# Patient Record
Sex: MALE | Race: OTHER | Employment: OTHER | URBAN - METROPOLITAN AREA
[De-identification: names, ages, dates, MRNs, and addresses within clinical notes are randomized per-mention and may not be internally consistent; named-entity substitution may affect disease eponyms.]

---

## 2019-01-07 ENCOUNTER — NEW PATIENT COMPREHENSIVE (OUTPATIENT)
Dept: URBAN - METROPOLITAN AREA CLINIC 39 | Facility: CLINIC | Age: 64
End: 2019-01-07

## 2019-01-07 DIAGNOSIS — H25.811: ICD-10-CM

## 2019-01-07 DIAGNOSIS — H25.812: ICD-10-CM

## 2019-01-07 DIAGNOSIS — H43.813: ICD-10-CM

## 2019-01-07 PROCEDURE — 1036F TOBACCO NON-USER: CPT

## 2019-01-07 PROCEDURE — 92015 DETERMINE REFRACTIVE STATE: CPT

## 2019-01-07 PROCEDURE — G8785 BP SCRN NO PERF AT INTERVAL: HCPCS

## 2019-01-07 PROCEDURE — 92004 COMPRE OPH EXAM NEW PT 1/>: CPT

## 2019-01-07 PROCEDURE — G9903 PT SCRN TBCO ID AS NON USER: HCPCS

## 2019-01-07 PROCEDURE — G8427 DOCREV CUR MEDS BY ELIG CLIN: HCPCS

## 2019-01-07 ASSESSMENT — VISUAL ACUITY
OD_SC: J2
OD_BAT: 20/400
OU_SC: 20/60-2
OD_SC: 20/70
OS_BAT: 20/400
OS_SC: 20/100
OS_SC: J1+
OU_SC: J1+

## 2019-01-07 ASSESSMENT — TONOMETRY
OD_IOP_MMHG: 10
OS_IOP_MMHG: 10

## 2019-01-15 ENCOUNTER — CATARACT CONSULT (OUTPATIENT)
Dept: URBAN - METROPOLITAN AREA CLINIC 39 | Facility: CLINIC | Age: 64
End: 2019-01-15

## 2019-01-15 VITALS — SYSTOLIC BLOOD PRESSURE: 144 MMHG | HEART RATE: 76 BPM | HEIGHT: 60 IN | DIASTOLIC BLOOD PRESSURE: 96 MMHG

## 2019-01-15 DIAGNOSIS — H25.811: ICD-10-CM

## 2019-01-15 DIAGNOSIS — Z98.890: ICD-10-CM

## 2019-01-15 DIAGNOSIS — H43.813: ICD-10-CM

## 2019-01-15 DIAGNOSIS — H25.812: ICD-10-CM

## 2019-01-15 PROCEDURE — 92025-2 CORNEAL TOPOGRAPHY, PT

## 2019-01-15 PROCEDURE — 92136TC INTERFEROMETRY - TECHNICAL COMPONENT

## 2019-01-15 PROCEDURE — 92015 DETERMINE REFRACTIVE STATE: CPT

## 2019-01-15 PROCEDURE — 92014 COMPRE OPH EXAM EST PT 1/>: CPT

## 2019-01-15 PROCEDURE — G8952 PRE-HTN/HTN, NO F/U, NOT GVN: HCPCS

## 2019-01-15 PROCEDURE — 1036F TOBACCO NON-USER: CPT

## 2019-01-15 PROCEDURE — V2799I IMPRIMIS

## 2019-01-15 PROCEDURE — G8427 DOCREV CUR MEDS BY ELIG CLIN: HCPCS

## 2019-01-15 PROCEDURE — G9903 PT SCRN TBCO ID AS NON USER: HCPCS

## 2019-01-15 PROCEDURE — 92134 CPTRZ OPH DX IMG PST SGM RTA: CPT

## 2019-01-15 ASSESSMENT — VISUAL ACUITY
OS_SC: 20/80+1
OS_GLARE: 20/400
OU_SC: 20/60-1
OD_SC: 20/60-1
OU_SC: J1+
OD_SC: J3
OS_SC: J1
OD_GLARE: 20/400

## 2019-01-15 ASSESSMENT — TONOMETRY
OD_IOP_MMHG: 12
OS_IOP_MMHG: 11

## 2019-01-31 ENCOUNTER — PRE-OP/H&P (OUTPATIENT)
Dept: URBAN - METROPOLITAN AREA CLINIC 39 | Facility: CLINIC | Age: 64
End: 2019-01-31

## 2019-01-31 ENCOUNTER — SURGERY/PROCEDURE (OUTPATIENT)
Dept: URBAN - METROPOLITAN AREA CLINIC 39 | Facility: CLINIC | Age: 64
End: 2019-01-31

## 2019-01-31 VITALS
SYSTOLIC BLOOD PRESSURE: 144 MMHG | HEIGHT: 60 IN | HEART RATE: 76 BPM | RESPIRATION RATE: 14 BRPM | DIASTOLIC BLOOD PRESSURE: 96 MMHG

## 2019-01-31 DIAGNOSIS — H25.811: ICD-10-CM

## 2019-01-31 PROCEDURE — 65772LRI LRI DURING CAT SX

## 2019-01-31 PROCEDURE — G9903 PT SCRN TBCO ID AS NON USER: HCPCS

## 2019-01-31 PROCEDURE — 99211T TECH SERVICE

## 2019-01-31 PROCEDURE — 66984CV REMOVE CATARACT, INSERT LENS, CUSTOM VISION

## 2019-01-31 PROCEDURE — G8428 CUR MEDS NOT DOCUMENT: HCPCS

## 2019-01-31 PROCEDURE — 66999LNSR LENSAR LASER FOR CAT SX

## 2019-01-31 PROCEDURE — G8952 PRE-HTN/HTN, NO F/U, NOT GVN: HCPCS

## 2019-01-31 PROCEDURE — G8418 CALC BMI BLW LOW PARAM F/U: HCPCS

## 2019-01-31 PROCEDURE — G8483 FLU IMM NO ADMIN DOC REA: HCPCS

## 2019-01-31 PROCEDURE — 4040F PNEUMOC VAC/ADMIN/RCVD: CPT

## 2019-01-31 PROCEDURE — 1036F TOBACCO NON-USER: CPT

## 2019-02-01 ENCOUNTER — CATARACT POST-OP 1-DAY (OUTPATIENT)
Dept: URBAN - METROPOLITAN AREA CLINIC 39 | Facility: CLINIC | Age: 64
End: 2019-02-01

## 2019-02-01 DIAGNOSIS — Z96.1: ICD-10-CM

## 2019-02-01 PROCEDURE — 99024 POSTOP FOLLOW-UP VISIT: CPT

## 2019-02-01 ASSESSMENT — VISUAL ACUITY
OD_SC: 20/40+2
OS_SC: 20/100

## 2019-02-01 ASSESSMENT — TONOMETRY
OS_IOP_MMHG: 14
OD_IOP_MMHG: 15

## 2019-02-04 ENCOUNTER — POST OP/EVAL OF SECOND EYE (OUTPATIENT)
Dept: URBAN - METROPOLITAN AREA CLINIC 40 | Facility: CLINIC | Age: 64
End: 2019-02-04

## 2019-02-04 DIAGNOSIS — H35.3120: ICD-10-CM

## 2019-02-04 DIAGNOSIS — H35.373: ICD-10-CM

## 2019-02-04 DIAGNOSIS — Z96.1: ICD-10-CM

## 2019-02-04 DIAGNOSIS — H25.812: ICD-10-CM

## 2019-02-04 DIAGNOSIS — H43.813: ICD-10-CM

## 2019-02-04 PROCEDURE — 92012 INTRM OPH EXAM EST PATIENT: CPT

## 2019-02-04 PROCEDURE — 99024 POSTOP FOLLOW-UP VISIT: CPT

## 2019-02-04 ASSESSMENT — VISUAL ACUITY
OS_SC: 20/80
OD_SC: 20/25-1
OS_BAT: 20/400

## 2019-02-04 ASSESSMENT — TONOMETRY: OD_IOP_MMHG: 10

## 2019-02-07 ENCOUNTER — SURGERY/PROCEDURE (OUTPATIENT)
Dept: URBAN - METROPOLITAN AREA CLINIC 39 | Facility: CLINIC | Age: 64
End: 2019-02-07

## 2019-02-07 ENCOUNTER — PRE-OP/H&P (OUTPATIENT)
Dept: URBAN - METROPOLITAN AREA CLINIC 39 | Facility: CLINIC | Age: 64
End: 2019-02-07

## 2019-02-07 VITALS
HEIGHT: 60 IN | DIASTOLIC BLOOD PRESSURE: 81 MMHG | SYSTOLIC BLOOD PRESSURE: 125 MMHG | RESPIRATION RATE: 17 BRPM | HEART RATE: 56 BPM

## 2019-02-07 DIAGNOSIS — H25.812: ICD-10-CM

## 2019-02-07 PROCEDURE — 4040F PNEUMOC VAC/ADMIN/RCVD: CPT

## 2019-02-07 PROCEDURE — G8418 CALC BMI BLW LOW PARAM F/U: HCPCS

## 2019-02-07 PROCEDURE — 66999LNSR LENSAR LASER FOR CAT SX

## 2019-02-07 PROCEDURE — 66984CV REMOVE CATARACT, INSERT LENS, CUSTOM VISION

## 2019-02-07 PROCEDURE — 65772LRI LRI DURING CAT SX

## 2019-02-07 PROCEDURE — 1036F TOBACCO NON-USER: CPT

## 2019-02-07 PROCEDURE — G8483 FLU IMM NO ADMIN DOC REA: HCPCS

## 2019-02-07 PROCEDURE — G9903 PT SCRN TBCO ID AS NON USER: HCPCS

## 2019-02-07 PROCEDURE — G8952 PRE-HTN/HTN, NO F/U, NOT GVN: HCPCS

## 2019-02-07 PROCEDURE — G8428 CUR MEDS NOT DOCUMENT: HCPCS

## 2019-02-07 PROCEDURE — 99211T TECH SERVICE

## 2019-02-08 ENCOUNTER — CATARACT POST-OP 1-DAY (OUTPATIENT)
Dept: URBAN - METROPOLITAN AREA CLINIC 39 | Facility: CLINIC | Age: 64
End: 2019-02-08

## 2019-02-08 DIAGNOSIS — Z96.1: ICD-10-CM

## 2019-02-08 PROCEDURE — 99024 POSTOP FOLLOW-UP VISIT: CPT

## 2019-02-08 ASSESSMENT — VISUAL ACUITY
OS_SC: 20/60
OS_SC: J3
OU_SC: 20/25+2
OD_SC: J12
OU_SC: J3
OD_SC: 20/25+1

## 2019-02-08 ASSESSMENT — TONOMETRY
OD_IOP_MMHG: 10
OS_IOP_MMHG: 10

## 2019-03-01 ENCOUNTER — CATARACT CONSULT (OUTPATIENT)
Dept: URBAN - METROPOLITAN AREA CLINIC 39 | Facility: CLINIC | Age: 64
End: 2019-03-01

## 2019-03-01 DIAGNOSIS — Z96.1: ICD-10-CM

## 2019-03-01 PROCEDURE — 99024 POSTOP FOLLOW-UP VISIT: CPT

## 2019-03-01 RX ORDER — PREDNISOLONE ACETATE 10 MG/ML
1 SUSPENSION/ DROPS OPHTHALMIC
Start: 2019-03-01

## 2019-03-01 ASSESSMENT — VISUAL ACUITY
OD_SC: 20/40-2
OS_SC: J2-
OD_SC: J12
OS_SC: 20/200

## 2019-03-01 ASSESSMENT — TONOMETRY
OD_IOP_MMHG: 10
OS_IOP_MMHG: 10

## 2019-04-01 ENCOUNTER — POST-OP CATARACT (OUTPATIENT)
Dept: URBAN - METROPOLITAN AREA CLINIC 40 | Facility: CLINIC | Age: 64
End: 2019-04-01

## 2019-04-01 DIAGNOSIS — Z96.1: ICD-10-CM

## 2019-04-01 DIAGNOSIS — H26.492: ICD-10-CM

## 2019-04-01 DIAGNOSIS — H26.491: ICD-10-CM

## 2019-04-01 PROCEDURE — 99024 POSTOP FOLLOW-UP VISIT: CPT

## 2019-04-01 ASSESSMENT — VISUAL ACUITY
OU_SC: J2
OD_BAT: 20/40
OD_PH: 20/25
OD_SC: J5
OS_SC: J2
OD_SC: 20/30+1
OS_PH: 20/40
OS_SC: 20/70
OS_BAT: 20/70
OU_SC: 20/30+2

## 2019-04-08 NOTE — PATIENT DISCUSSION
I and r today and pt got a lense in and out. Disc DWO and no sleeping and reviewed hygiene. Recheck in 1-2 weeks.

## 2019-05-06 ENCOUNTER — POST-OP CATARACT (OUTPATIENT)
Dept: URBAN - METROPOLITAN AREA CLINIC 40 | Facility: CLINIC | Age: 64
End: 2019-05-06

## 2019-05-06 DIAGNOSIS — H26.492: ICD-10-CM

## 2019-05-06 DIAGNOSIS — Z96.1: ICD-10-CM

## 2019-05-06 DIAGNOSIS — H26.491: ICD-10-CM

## 2019-05-06 PROCEDURE — 99024 POSTOP FOLLOW-UP VISIT: CPT

## 2019-05-06 ASSESSMENT — VISUAL ACUITY
OS_SC: 20/60-1
OS_BAT: 20/70
OU_SC: 20/25-1
OS_PH: 20/30
OS_SC: J2
OU_SC: J2+
OD_BAT: 20/30
OD_SC: J5
OD_SC: 20/25-1

## 2019-05-06 ASSESSMENT — KERATOMETRY
OS_AXISANGLE2_DEGREES: 100
OD_AXISANGLE_DEGREES: 18
OD_AXISANGLE2_DEGREES: 108
OD_K1POWER_DIOPTERS: 41.5
OS_K1POWER_DIOPTERS: 41.75
OS_K2POWER_DIOPTERS: 42.25
OD_K2POWER_DIOPTERS: 42.25
OS_AXISANGLE_DEGREES: 10

## 2019-05-06 ASSESSMENT — TONOMETRY
OS_IOP_MMHG: 15
OD_IOP_MMHG: 14

## 2019-08-09 ASSESSMENT — KERATOMETRY
OS_AXISANGLE2_DEGREES: 100
OS_K2POWER_DIOPTERS: 42.25
OD_K1POWER_DIOPTERS: 41.5
OS_K1POWER_DIOPTERS: 41.75
OS_AXISANGLE_DEGREES: 10
OD_AXISANGLE2_DEGREES: 108
OD_K2POWER_DIOPTERS: 42.25
OD_AXISANGLE_DEGREES: 18

## 2019-08-12 ENCOUNTER — ESTABLISHED COMPREHENSIVE EXAM (OUTPATIENT)
Dept: URBAN - METROPOLITAN AREA CLINIC 40 | Facility: CLINIC | Age: 64
End: 2019-08-12

## 2019-08-12 DIAGNOSIS — H43.813: ICD-10-CM

## 2019-08-12 DIAGNOSIS — H52.223: ICD-10-CM

## 2019-08-12 DIAGNOSIS — H35.373: ICD-10-CM

## 2019-08-12 DIAGNOSIS — H52.12: ICD-10-CM

## 2019-08-12 DIAGNOSIS — H26.492: ICD-10-CM

## 2019-08-12 DIAGNOSIS — H35.3120: ICD-10-CM

## 2019-08-12 DIAGNOSIS — H26.491: ICD-10-CM

## 2019-08-12 PROCEDURE — 92014 COMPRE OPH EXAM EST PT 1/>: CPT

## 2019-08-12 PROCEDURE — 92015 DETERMINE REFRACTIVE STATE: CPT

## 2019-08-12 PROCEDURE — 92134 CPTRZ OPH DX IMG PST SGM RTA: CPT

## 2019-08-12 PROCEDURE — 92499OP2 OPTOMAP RETINAL SCREENING BOTH EYES

## 2019-08-12 ASSESSMENT — VISUAL ACUITY
OD_SC: 20/20-1
OD_SC: J5
OU_SC: J1
OS_SC: 20/60+1
OS_SC: J2
OU_SC: 20/25+2

## 2019-08-12 ASSESSMENT — TONOMETRY
OD_IOP_MMHG: 12
OS_IOP_MMHG: 12

## 2023-04-03 ENCOUNTER — COMPREHENSIVE EXAM (OUTPATIENT)
Dept: URBAN - METROPOLITAN AREA CLINIC 40 | Facility: CLINIC | Age: 68
End: 2023-04-03

## 2023-04-03 DIAGNOSIS — H43.813: ICD-10-CM

## 2023-04-03 DIAGNOSIS — H35.373: ICD-10-CM

## 2023-04-03 DIAGNOSIS — H52.223: ICD-10-CM

## 2023-04-03 DIAGNOSIS — H35.3120: ICD-10-CM

## 2023-04-03 DIAGNOSIS — H52.12: ICD-10-CM

## 2023-04-03 DIAGNOSIS — H26.493: ICD-10-CM

## 2023-04-03 PROCEDURE — 92015 DETERMINE REFRACTIVE STATE: CPT

## 2023-04-03 PROCEDURE — 92134 CPTRZ OPH DX IMG PST SGM RTA: CPT

## 2023-04-03 PROCEDURE — 92004 COMPRE OPH EXAM NEW PT 1/>: CPT

## 2023-04-03 ASSESSMENT — KERATOMETRY
OD_AXISANGLE_DEGREES: 18
OD_K2POWER_DIOPTERS: 42.25
OD_AXISANGLE2_DEGREES: 108
OS_AXISANGLE_DEGREES: 10
OS_K2POWER_DIOPTERS: 42.25
OS_K1POWER_DIOPTERS: 41.75
OS_AXISANGLE2_DEGREES: 100
OD_K1POWER_DIOPTERS: 41.5

## 2023-04-03 ASSESSMENT — VISUAL ACUITY
OS_SC: J1
OD_SC: J5
OU_SC: 20/25
OD_SC: 20/25
OU_SC: J1
OS_SC: 20/80-1

## 2023-04-03 ASSESSMENT — TONOMETRY
OS_IOP_MMHG: 12
OD_IOP_MMHG: 12

## 2024-04-08 ENCOUNTER — COMPREHENSIVE EXAM (OUTPATIENT)
Dept: URBAN - METROPOLITAN AREA CLINIC 40 | Facility: CLINIC | Age: 69
End: 2024-04-08

## 2024-04-08 DIAGNOSIS — H35.3120: ICD-10-CM

## 2024-04-08 DIAGNOSIS — H43.813: ICD-10-CM

## 2024-04-08 DIAGNOSIS — H52.223: ICD-10-CM

## 2024-04-08 DIAGNOSIS — H35.373: ICD-10-CM

## 2024-04-08 DIAGNOSIS — H26.493: ICD-10-CM

## 2024-04-08 DIAGNOSIS — H52.12: ICD-10-CM

## 2024-04-08 PROCEDURE — 92014 COMPRE OPH EXAM EST PT 1/>: CPT

## 2024-04-08 PROCEDURE — 92250E RETINAL SCREENING, ELECTIVE

## 2024-04-08 PROCEDURE — 92134 CPTRZ OPH DX IMG PST SGM RTA: CPT | Mod: 25

## 2024-04-08 PROCEDURE — 92015 DETERMINE REFRACTIVE STATE: CPT

## 2024-04-08 ASSESSMENT — VISUAL ACUITY
OS_SC: 20/100+2
OS_SC: J2
OD_SC: J12
OU_SC: 20/30-2
OU_SC: J2
OD_SC: 20/30

## 2024-04-08 ASSESSMENT — KERATOMETRY
OD_K1POWER_DIOPTERS: 41.5
OS_K1POWER_DIOPTERS: 41.75
OD_AXISANGLE2_DEGREES: 108
OD_AXISANGLE_DEGREES: 18
OS_K2POWER_DIOPTERS: 42.25
OS_AXISANGLE2_DEGREES: 100
OS_AXISANGLE_DEGREES: 10
OD_K2POWER_DIOPTERS: 42.25

## 2024-04-08 ASSESSMENT — TONOMETRY
OS_IOP_MMHG: 10
OD_IOP_MMHG: 11

## 2025-04-14 ENCOUNTER — COMPREHENSIVE EXAM (OUTPATIENT)
Age: 70
End: 2025-04-14

## 2025-04-14 DIAGNOSIS — H43.813: ICD-10-CM

## 2025-04-14 DIAGNOSIS — H35.3120: ICD-10-CM

## 2025-04-14 DIAGNOSIS — H52.223: ICD-10-CM

## 2025-04-14 DIAGNOSIS — H35.373: ICD-10-CM

## 2025-04-14 DIAGNOSIS — H26.493: ICD-10-CM

## 2025-04-14 DIAGNOSIS — H52.12: ICD-10-CM

## 2025-04-14 PROCEDURE — 92014 COMPRE OPH EXAM EST PT 1/>: CPT

## 2025-04-14 PROCEDURE — 92015 DETERMINE REFRACTIVE STATE: CPT

## 2025-04-14 PROCEDURE — 92250E RETINAL SCREENING, ELECTIVE

## 2025-04-14 PROCEDURE — 92134 CPTRZ OPH DX IMG PST SGM RTA: CPT | Mod: 25
